# Patient Record
Sex: MALE | Race: WHITE
[De-identification: names, ages, dates, MRNs, and addresses within clinical notes are randomized per-mention and may not be internally consistent; named-entity substitution may affect disease eponyms.]

---

## 2017-06-05 ENCOUNTER — HOSPITAL ENCOUNTER (OUTPATIENT)
Dept: HOSPITAL 50 - VM.SDS | Age: 77
Discharge: HOME | End: 2017-06-05
Attending: SURGERY
Payer: MEDICARE

## 2017-06-05 VITALS — SYSTOLIC BLOOD PRESSURE: 127 MMHG | DIASTOLIC BLOOD PRESSURE: 72 MMHG

## 2017-06-05 DIAGNOSIS — I10: ICD-10-CM

## 2017-06-05 DIAGNOSIS — Z79.899: ICD-10-CM

## 2017-06-05 DIAGNOSIS — Z72.0: ICD-10-CM

## 2017-06-05 DIAGNOSIS — D12.6: Primary | ICD-10-CM

## 2017-06-05 DIAGNOSIS — Z98.890: ICD-10-CM

## 2017-06-05 PROCEDURE — 45380 COLONOSCOPY AND BIOPSY: CPT

## 2017-06-05 NOTE — OR
PREOPERATIVE DIAGNOSIS:  Positive FIT test.

 

POSTOPERATIVE DIAGNOSIS:  Positive FIT test.

 

PROCEDURE:  Colonoscopy.

 

INDICATION:  The patient is a 77-year-old male, who presents for colonoscopy at

this time.

 

NARRATIVE:  This was done in the endoscopy suite.  Sedation was given per

Anesthesia.  He was placed in left lateral position.  First, a rectal exam was

done, it was normal.  Scope was introduced in the rectum, slowly advanced

through the rectum, sigmoid, descending, transverse, and ascending colon.  I was

not able to reach the cecum despite multiple patient position maneuvers and

retraction maneuvers.  I suspect I reached the distal transverse colon.  At this

point, I slowly withdrew, I found 1 polyp at about 40 cm which was removed by

hot loop forceps.  The remainder of the exam was normal.  Again, incomplete

colonoscopy, 1 polyp at 40 cm.  Barium enema scheduled.

 

 

BKD:  06/05/2017 13:23:08  MODL:  06/05/2017 18:52:22

Job #:  [id number]

## 2020-12-26 ENCOUNTER — HOSPITAL ENCOUNTER (EMERGENCY)
Dept: HOSPITAL 50 - VM.ED | Age: 80
Discharge: HOME | End: 2020-12-26
Payer: MEDICARE

## 2020-12-26 VITALS — HEART RATE: 51 BPM | SYSTOLIC BLOOD PRESSURE: 113 MMHG | DIASTOLIC BLOOD PRESSURE: 53 MMHG

## 2020-12-26 DIAGNOSIS — Z79.899: ICD-10-CM

## 2020-12-26 DIAGNOSIS — I25.2: ICD-10-CM

## 2020-12-26 DIAGNOSIS — I50.9: ICD-10-CM

## 2020-12-26 DIAGNOSIS — I11.0: Primary | ICD-10-CM

## 2020-12-26 DIAGNOSIS — N42.9: ICD-10-CM

## 2020-12-26 LAB
ANION GAP SERPL CALC-SCNC: 9.1 MMOL/L (ref 10–20)
CHLORIDE SERPL-SCNC: 103 MMOL/L (ref 98–107)
SODIUM SERPL-SCNC: 137 MMOL/L (ref 136–145)

## 2020-12-26 NOTE — EDM.PDOC
ED HPI GENERAL MEDICAL PROBLEM





- General


Chief Complaint: General


Stated Complaint: SWOLLEN LEGS


Time Seen by Provider: 12/26/20 10:00


Source of Information: Reports: Patient


History Limitations: Reports: No Limitations





- History of Present Illness


INITIAL COMMENTS - FREE TEXT/NARRATIVE: 





Pt. presents to ER with complaints of swollen feet/ankles. Pt. states that he 

was discharged from [facility] several days ago after being admitted for covid 19 

with associated MI. He states that he has been dealing with swollen feet since 

discharge, but he is concerned because they are getting worse. Denies any other 

current symptoms. No chest pain or shortness of breath. No nausea, vomiting or 

lightheadedness. Denies any palpitations.


Onset: Today


Onset Date: 12/26/20


Location: Reports: Lower Extremity, Left, Lower Extremity, Right





- Related Data


                                    Allergies











Allergy/AdvReac Type Severity Reaction Status Date / Time


 


No Known Allergies Allergy   Verified 12/26/20 11:34











Home Meds: 


                                    Home Meds





Albuterol Sulfate [Proair Respiclick] 90 mcg IH Q6HR PRN 06/01/17 [History]


Fosinopril [Monopril] 1 tab PO DAILY 06/01/17 [History]


Metoprolol Tartrate 1 tab PO DAILY 06/01/17 [History]


Tamsulosin HCl 1 cap PO DAILY 06/01/17 [History]


Tolterodine Tartrate 1 mg PO BID 06/01/17 [History]


Triamterene/Hydrochlorothiazid [Triamterene-HCTZ 75-50 MG] 1 tab PO DAILY 

06/01/17 [History]











Past Medical History


HEENT History: Reports: None


Cardiovascular History: Reports: Hypertension, MI


Respiratory History: Reports: None


Gastrointestinal History: Reports: Other (See Below)


Other Gastrointestinal History: + FIT


Genitourinary History: Reports: None


Musculoskeletal History: Reports: None


Neurological History: Reports: None


Psychiatric History: Reports: None


Endocrine/Metabolic History: Reports: None


Hematologic History: Reports: None


Immunologic History: Reports: None


Oncologic (Cancer) History: Reports: Prostate


Dermatologic History: Reports: None





- Infectious Disease History


Infectious Disease History: Reports: Novel Coronavirus





- Past Surgical History


Head Surgeries/Procedures: Reports: None


HEENT Surgical History: Reports: Cataract Surgery, Tonsillectomy


GI Surgical History: Reports: None


Musculoskeletal Surgical History: Reports: Knee Replacement





Social & Family History





- Tobacco Use


Tobacco Use Status *Q: Current Status Unknown





ED ROS GENERAL





- Review of Systems


Review Of Systems: See Below


Constitutional: Reports: No Symptoms


HEENT: Reports: No Symptoms


Respiratory: Reports: No Symptoms


Cardiovascular: Reports: Edema


Endocrine: Reports: No Symptoms


GI/Abdominal: Reports: No Symptoms


: Reports: No Symptoms


Musculoskeletal: Reports: Other


Skin: Reports: No Symptoms


Neurological: Reports: No Symptoms


Psychiatric: Reports: No Symptoms


Hematologic/Lymphatic: Reports: No Symptoms


Immunologic: Reports: No Symptoms





ED EXAM, GENERAL





- Physical Exam


Exam: See Below


Exam Limited By: No Limitations


General Appearance: Alert, WD/WN, No Apparent Distress


Respiratory/Chest: No Respiratory Distress, Lungs Clear, No Accessory Muscle 

Use, Chest Non-Tender


Cardiovascular: Regular Rate, Rhythm, No JVD, No Murmur, Other (+3 edema to feet

 and ankles)


GI/Abdominal: Soft, Non-Tender, No Distention, No Mass


 (Male) Exam: Deferred


Rectal (Males) Exam: Deferred


Extremities: Normal Range of Motion, Non-Tender, Normal Capillary Refill, Other 

(edema)


Neurological: Alert, Oriented, CN II-XII Intact, Normal Cognition, Normal 

Reflexes, No Motor/Sensory Deficits


Psychiatric: Normal Affect, Normal Mood


Skin Exam: Warm, Dry, Intact, Normal Color, No Rash


Lymphatic: No Adenopathy





Course





- Vital Signs


Last Recorded V/S: 


                                Last Vital Signs











Temp  36.3 C   12/26/20 09:52


 


Pulse  51 L  12/26/20 09:52


 


Resp  20   12/26/20 09:52


 


BP  113/53 L  12/26/20 09:52


 


Pulse Ox  95   12/26/20 09:52














- Orders/Labs/Meds


Labs: 


                                Laboratory Tests











  12/26/20 12/26/20 Range/Units





  10:30 10:30 


 


WBC  7.3   (4.0-10.0)  x10^3/uL


 


RBC  3.68 L   (4.5-6.0)  x10^6/uL


 


Hgb  11.1 L   (14.0-18.0)  g/dL


 


Hct  35.1 L   (40.0-52.0)  %


 


MCV  95.4 H   (78.0-93.0)  fL


 


MCH  30.2   (26.0-32.0)  pg


 


MCHC  31.6 L   (32.0-36.0)  g/dL


 


RDW Coeff of Nica  15.7 H   (10.0-15.0)  %


 


Plt Count  106 L   (130-400)  x10^3/uL


 


Neut % (Auto)  76.9   (50.0-80.0)  %


 


Lymph % (Auto)  10.1 L   (25.0-50.0)  %


 


Mono % (Auto)  8.6   (2.0-11.0)  %


 


Eos % (Auto)  4.0   (0.0-4.0)  %


 


Baso % (Auto)  0.4   (0.2-1.2)  %


 


Sodium   137  (136-145)  mmol/L


 


Potassium   4.1  (3.5-5.1)  mmol/L


 


Chloride   103  ()  mmol/L


 


Carbon Dioxide   29  (21-32)  mmol/L


 


Anion Gap   9.1 L  (10-20)  mmol/L


 


BUN   26 H  (7-18)  mg/dL


 


Creatinine   1.2  (0.70-1.30)  mg/dL


 


Est Cr Clr Drug Dosing   TNP  


 


Estimated GFR (MDRD)   58  


 


Glucose   94  ()  mg/dL


 


Calcium   10.0  (8.5-10.1)  mg/dL


 


Corrected Calcium   11.04 H  (8.5-10.1)  mg/dL


 


Total Bilirubin   0.9  (0.2-1.0)  mg/dL


 


AST   34  (15-37)  U/L


 


ALT   34  (16-63)  U/L


 


Alkaline Phosphatase   110  ()  U/L


 


NT-Pro-B Natriuret Pep   2964 H  (<=450)  pg/mL


 


Total Protein   6.8  (6.4-8.2)  g/dL


 


Albumin   2.7 L  (3.4-5.0)  g/dL


 


Globulin   4.1  


 


Albumin/Globulin Ratio   0.66  














Departure





- Departure


Time of Disposition: 14:43


Disposition: Home, Self-Care 01


Clinical Impression: 


 Peripheral edema, CHF (congestive heart failure)








- Discharge Information


Instructions:  Furosemide tablets, Peripheral Edema


Referrals: 


Heydi Abbott NP [Primary Care Provider] - 


Forms:  ED Department Discharge


Additional Instructions: 


Lasix 40mg


1 daily for 7 days. Start this today.





They will order your [employer]AW[employer] hose to be picked up on Wednesday.





Recheck in clinic in 7-10 days








Sepsis Event Note (ED)





- Evaluation


Sepsis Screening Result: No Definite Risk





- Focused Exam


Vital Signs: 


                                   Vital Signs











  Temp Pulse Resp BP Pulse Ox


 


 12/26/20 09:52  36.3 C  51 L  20  113/53 L  95














- Problem List Review


Problem List Initiated/Reviewed/Updated: Yes





- Assessment/Plan


Plan: 





Lasix 40mg


1 daily for 7 days. Start this today.





They will order your [employer] hose to be picked up on Wednesday.





Recheck in clinic in 7-10 days

## 2023-04-28 ENCOUNTER — HOSPITAL ENCOUNTER (EMERGENCY)
Dept: HOSPITAL 50 - VM.ED | Age: 83
Discharge: ANOTHER HOSPITAL | End: 2023-04-28
Payer: MEDICARE

## 2023-04-28 VITALS — HEART RATE: 80 BPM

## 2023-04-28 VITALS — DIASTOLIC BLOOD PRESSURE: 76 MMHG | SYSTOLIC BLOOD PRESSURE: 150 MMHG

## 2023-04-28 DIAGNOSIS — I25.2: ICD-10-CM

## 2023-04-28 DIAGNOSIS — N41.9: ICD-10-CM

## 2023-04-28 DIAGNOSIS — I10: ICD-10-CM

## 2023-04-28 DIAGNOSIS — Z79.02: ICD-10-CM

## 2023-04-28 DIAGNOSIS — Z86.16: ICD-10-CM

## 2023-04-28 DIAGNOSIS — Z79.82: ICD-10-CM

## 2023-04-28 DIAGNOSIS — K56.2: Primary | ICD-10-CM

## 2023-04-28 LAB
ANION GAP SERPL CALC-SCNC: 12.3 MMOL/L (ref 5–15)
APTT PPP: 24.9 SEC (ref 23.6–33.6)
CHLORIDE SERPL-SCNC: 105 MMOL/L (ref 98–107)
EGFRCR SERPLBLD CKD-EPI 2021: 55 ML/MIN (ref 60–?)
SODIUM SERPL-SCNC: 143 MMOL/L (ref 136–145)

## 2023-05-03 ENCOUNTER — HOSPITAL ENCOUNTER (EMERGENCY)
Dept: HOSPITAL 50 - VM.ED | Age: 83
Discharge: NURSING, REHAB OR LONG-TERM CARE | End: 2023-05-03
Payer: MEDICARE

## 2023-05-03 VITALS — HEART RATE: 50 BPM | SYSTOLIC BLOOD PRESSURE: 145 MMHG | DIASTOLIC BLOOD PRESSURE: 69 MMHG

## 2023-05-03 DIAGNOSIS — Z79.02: ICD-10-CM

## 2023-05-03 DIAGNOSIS — S00.01XA: ICD-10-CM

## 2023-05-03 DIAGNOSIS — I10: ICD-10-CM
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
## 2023-05-03 DIAGNOSIS — Z79.82: ICD-10-CM

## 2023-05-03 DIAGNOSIS — Z86.16: ICD-10-CM

## 2023-05-03 DIAGNOSIS — S00.81XA: ICD-10-CM

## 2023-05-03 DIAGNOSIS — I25.2: ICD-10-CM

## 2023-05-03 DIAGNOSIS — S51.812A: Primary | ICD-10-CM

## 2023-05-05 ENCOUNTER — HOSPITAL ENCOUNTER (EMERGENCY)
Dept: HOSPITAL 50 - VM.ED | Age: 83
Discharge: ANOTHER HOSPITAL | End: 2023-05-05
Payer: MEDICARE

## 2023-05-05 VITALS — DIASTOLIC BLOOD PRESSURE: 77 MMHG | SYSTOLIC BLOOD PRESSURE: 145 MMHG | HEART RATE: 57 BPM

## 2023-05-05 DIAGNOSIS — Z79.899: ICD-10-CM

## 2023-05-05 DIAGNOSIS — Z87.891: ICD-10-CM

## 2023-05-05 DIAGNOSIS — I25.2: ICD-10-CM

## 2023-05-05 DIAGNOSIS — Z79.02: ICD-10-CM

## 2023-05-05 DIAGNOSIS — Z79.82: ICD-10-CM

## 2023-05-05 DIAGNOSIS — Z86.16: ICD-10-CM

## 2023-05-05 DIAGNOSIS — I10: ICD-10-CM

## 2023-05-05 DIAGNOSIS — K56.2: Primary | ICD-10-CM

## 2023-05-05 LAB
ALBUMIN SERPL-MCNC: 3.1 G/DL (ref 3.4–5)
ALBUMIN/GLOB SERPL: 0.79 {RATIO}
ALP SERPL-CCNC: 103 U/L (ref 46–116)
ALT SERPL-CCNC: 20 U/L (ref 16–63)
ANION GAP SERPL CALC-SCNC: 9.1 MMOL/L (ref 5–15)
AST SERPL-CCNC: 21 U/L (ref 15–37)
BASOPHILS # BLD AUTO: 0 X10^3/UL (ref 0–0.2)
BASOPHILS NFR BLD AUTO: 0.4 % (ref 0.2–1.2)
BILIRUB SERPL-MCNC: 0.5 MG/DL (ref 0.2–1)
BNP SERPL-MCNC: 2227 PG/ML (ref ?–450)
BUN SERPL-MCNC: 15 MG/DL (ref 7–18)
CALCIUM SERPL-MCNC: 10.3 MG/DL (ref 8.5–10.1)
CHLORIDE SERPL-SCNC: 103 MMOL/L (ref 98–107)
CK SERPL-CCNC: 41 U/L (ref 39–308)
CO2 SERPL-SCNC: 34 MMOL/L (ref 21–32)
CREAT CL 24H UR+SERPL-VRATE: (no result) ML/MIN
CREAT SERPL-MCNC: 0.9 MG/DL (ref 0.7–1.3)
CRP SERPL-MCNC: 9 MG/DL (ref ?–0.9)
EGFRCR SERPLBLD CKD-EPI 2021: 85 ML/MIN (ref 60–?)
EOSINOPHIL # BLD AUTO: 0.3 X10^3/UL (ref 0–0.5)
EOSINOPHIL NFR BLD AUTO: 3.4 % (ref 0–4)
GLOBULIN SER-MCNC: 3.9 G/DL
GLUCOSE SERPL-MCNC: 92 MG/DL (ref 70–99)
HCT VFR BLD AUTO: 35.1 % (ref 40–52)
HGB BLD-MCNC: 11.4 G/DL (ref 14–18)
IMM GRANULOCYTES # BLD: 0.03 X10^3/UL (ref 0–0.07)
IMM GRANULOCYTES NFR BLD: 0.4 % (ref 0–0.43)
LDH SERPL-CCNC: 163 U/L (ref 85–227)
LYMPHOCYTES # BLD AUTO: 0.5 X10^3/UL (ref 1–4.8)
LYMPHOCYTES NFR BLD AUTO: 6.7 % (ref 25–50)
MCH RBC QN AUTO: 28.4 PG (ref 26–32)
MCHC RBC AUTO-ENTMCNC: 32.5 G/DL (ref 32–36)
MCHC RBC AUTO-ENTMCNC: 87.5 FL (ref 78–93)
MONOCYTES # BLD AUTO: 0.6 X10^3/UL (ref 0–0.8)
MONOCYTES NFR BLD AUTO: 8 % (ref 2–11)
NEUTROPHILS # BLD AUTO: 6.3 X10^3/UL (ref 1.8–7.7)
NEUTROPHILS NFR BLD AUTO: 81.1 % (ref 50–80)
PLATELET # BLD AUTO: 174 X10^3/UL (ref 130–400)
POTASSIUM SERPL-SCNC: 3.1 MMOL/L (ref 3.5–5.1)
PROT SERPL-MCNC: 7 G/DL (ref 6.4–8.2)
RBC # BLD AUTO: 4.01 X10^6/UL (ref 4.5–6)
SODIUM SERPL-SCNC: 143 MMOL/L (ref 136–145)
WBC # BLD AUTO: 7.7 X10^3/UL (ref 4–10)